# Patient Record
Sex: FEMALE | Race: AMERICAN INDIAN OR ALASKA NATIVE
[De-identification: names, ages, dates, MRNs, and addresses within clinical notes are randomized per-mention and may not be internally consistent; named-entity substitution may affect disease eponyms.]

---

## 2018-06-27 ENCOUNTER — HOSPITAL ENCOUNTER (EMERGENCY)
Dept: HOSPITAL 5 - ED | Age: 48
Discharge: HOME | End: 2018-06-27
Payer: COMMERCIAL

## 2018-06-27 VITALS — DIASTOLIC BLOOD PRESSURE: 90 MMHG | SYSTOLIC BLOOD PRESSURE: 147 MMHG

## 2018-06-27 DIAGNOSIS — E11.9: ICD-10-CM

## 2018-06-27 DIAGNOSIS — Z79.84: ICD-10-CM

## 2018-06-27 DIAGNOSIS — I10: ICD-10-CM

## 2018-06-27 DIAGNOSIS — R10.12: Primary | ICD-10-CM

## 2018-06-27 DIAGNOSIS — Z90.710: ICD-10-CM

## 2018-06-27 DIAGNOSIS — Z86.73: ICD-10-CM

## 2018-06-27 LAB
ALBUMIN SERPL-MCNC: 4.4 G/DL (ref 3.9–5)
ALT SERPL-CCNC: 19 UNITS/L (ref 7–56)
BASOPHILS # (AUTO): 0 K/MM3 (ref 0–0.1)
BASOPHILS NFR BLD AUTO: 0.7 % (ref 0–1.8)
BILIRUB UR QL STRIP: (no result)
BLOOD UR QL VISUAL: (no result)
BUN SERPL-MCNC: 11 MG/DL (ref 7–17)
BUN/CREAT SERPL: 12 %
CALCIUM SERPL-MCNC: 9.5 MG/DL (ref 8.4–10.2)
EOSINOPHIL # BLD AUTO: 0.1 K/MM3 (ref 0–0.4)
EOSINOPHIL NFR BLD AUTO: 1 % (ref 0–4.3)
HCT VFR BLD CALC: 38.9 % (ref 30.3–42.9)
HEMOLYSIS INDEX: 4
HGB BLD-MCNC: 13.3 GM/DL (ref 10.1–14.3)
LIPASE SERPL-CCNC: 46 UNITS/L (ref 13–60)
LYMPHOCYTES # BLD AUTO: 2.1 K/MM3 (ref 1.2–5.4)
LYMPHOCYTES NFR BLD AUTO: 34.9 % (ref 13.4–35)
MCH RBC QN AUTO: 32 PG (ref 28–32)
MCHC RBC AUTO-ENTMCNC: 34 % (ref 30–34)
MCV RBC AUTO: 94 FL (ref 79–97)
MONOCYTES # (AUTO): 0.4 K/MM3 (ref 0–0.8)
MONOCYTES % (AUTO): 7.1 % (ref 0–7.3)
PH UR STRIP: 6 [PH] (ref 5–7)
PLATELET # BLD: 153 K/MM3 (ref 140–440)
PROT UR STRIP-MCNC: (no result) MG/DL
RBC # BLD AUTO: 4.14 M/MM3 (ref 3.65–5.03)
RBC #/AREA URNS HPF: 1 /HPF (ref 0–6)
UROBILINOGEN UR-MCNC: < 2 MG/DL (ref ?–2)
WBC #/AREA URNS HPF: 1 /HPF (ref 0–6)

## 2018-06-27 PROCEDURE — 81001 URINALYSIS AUTO W/SCOPE: CPT

## 2018-06-27 PROCEDURE — 85025 COMPLETE CBC W/AUTO DIFF WBC: CPT

## 2018-06-27 PROCEDURE — 83690 ASSAY OF LIPASE: CPT

## 2018-06-27 PROCEDURE — 99283 EMERGENCY DEPT VISIT LOW MDM: CPT

## 2018-06-27 PROCEDURE — 80053 COMPREHEN METABOLIC PANEL: CPT

## 2018-06-27 PROCEDURE — 36415 COLL VENOUS BLD VENIPUNCTURE: CPT

## 2018-06-27 NOTE — EMERGENCY DEPARTMENT REPORT
ED Abdominal Pain HPI





- General


Chief Complaint: Abdominal Pain


Stated Complaint: ABD PAIN


Time Seen by Provider: 06/27/18 14:42


Source: patient


Mode of arrival: Ambulatory


Limitations: No Limitations





- History of Present Illness


MD Complaint: flank pain


-: month(s) (4)


Location: L flank


Radiation: LUQ


Severity: moderate


Quality: cramping, sharp, dull


Improves With: other (Tylenol)


Worsens With: movement


Context: other (lifts heavy boxes at work, normal physical 2 months ago in 

April by Dr. Ida Joshua)





- Related Data


 Previous Rx's











 Medication  Instructions  Recorded  Last Taken  Type


 


Aspirin [Aspirin BABY CHEW TAB] 81 mg PO QDAY #30 tab.chew 06/03/16 Unknown Rx


 


Hydrochlorothiazide [HCTZ] 25 mg PO QDAY #30 tablet 06/03/16 Unknown Rx


 


Insulin Glargine [Lantus VIAL] 15 units SUB-Q QHS #10 units 06/03/16 Unknown Rx


 


Lisinopril [Zestril TAB] 10 mg PO QDAY #30 tablet 06/03/16 Unknown Rx


 


Metoprolol [Lopressor TAB] 25 mg PO BID #60 tablet 06/03/16 Unknown Rx


 


metFORMIN [Glucophage] 500 mg PO BID #60 tablet 06/03/16 Unknown Rx


 


Ibuprofen 800 mg PO Q6H PRN #20 tablet 06/27/18 Unknown Rx











 Allergies











Allergy/AdvReac Type Severity Reaction Status Date / Time


 


No Known Allergies Allergy   Unverified 07/28/14 07:31














ED Review of Systems


ROS: 


Stated complaint: ABD PAIN


Other details as noted in HPI





Comment: All other systems reviewed and negative


Constitutional: denies: fever, malaise


Respiratory: denies: cough


Cardiovascular: denies: chest pain





ED Past Medical Hx





- Past Medical History


Previous Medical History?: Yes


Hx Hypertension: Yes


Hx CVA: Yes


Hx Heart Attack/AMI: No


Hx Congestive Heart Failure: No


Hx Diabetes: Yes (Newly diagnosed this admission.)


Hx Deep Vein Thrombosis: No


Hx Liver Disease: No


Hx Sickle Cell Disease: No


Hx Asthma: No


Hx COPD: No


Hx Dementia: No


Hx HIV: No





- Surgical History


Past Surgical History?: Yes


Hx Coronary Stent: No


Hx Open Heart Surgery: No


Hx Pacemaker: No


Hx Internal Defibrillator: No


Hx Cholecystectomy: No


Hx Appendectomy: No


Hx Breast Surgery: No


Additional Surgical History: hysterectomy





- Social History


Smoking Status: Never Smoker


Substance Use Type: Prescribed





- Medications


Home Medications: 


 Home Medications











 Medication  Instructions  Recorded  Confirmed  Last Taken  Type


 


Aspirin [Aspirin BABY CHEW TAB] 81 mg PO QDAY #30 tab.chew 06/03/16  Unknown Rx


 


Hydrochlorothiazide [HCTZ] 25 mg PO QDAY #30 tablet 06/03/16  Unknown Rx


 


Insulin Glargine [Lantus VIAL] 15 units SUB-Q QHS #10 units 06/03/16  Unknown Rx


 


Lisinopril [Zestril TAB] 10 mg PO QDAY #30 tablet 06/03/16  Unknown Rx


 


Metoprolol [Lopressor TAB] 25 mg PO BID #60 tablet 06/03/16  Unknown Rx


 


metFORMIN [Glucophage] 500 mg PO BID #60 tablet 06/03/16  Unknown Rx


 


Ibuprofen 800 mg PO Q6H PRN #20 tablet 06/27/18  Unknown Rx














ED Physical Exam





- General


Limitations: No Limitations


General appearance: alert, in no apparent distress





- Head


Head exam: Present: atraumatic, normocephalic





- Eye


Eye exam: Present: normal appearance





- ENT


ENT exam: Present: mucous membranes moist





- Neck


Neck exam: Present: normal inspection.  Absent: tenderness, meningismus





- Respiratory


Respiratory exam: Present: normal lung sounds bilaterally.  Absent: respiratory 

distress, wheezes, rales, rhonchi





- Cardiovascular


Cardiovascular Exam: Present: regular rate, normal rhythm, normal heart sounds.

  Absent: bradycardia, tachycardia, systolic murmur, diastolic murmur, rubs, 

gallop





- GI/Abdominal


GI/Abdominal exam: Present: soft.  Absent: distended, tenderness, guarding, 

rebound





- Extremities Exam


Extremities exam: Present: normal inspection





- Back Exam


Back exam: Present: normal inspection





- Neurological Exam


Neurological exam: Present: alert, oriented X3





- Psychiatric


Psychiatric exam: Present: normal affect, normal mood





- Skin


Skin exam: Present: warm, dry, intact, normal color.  Absent: rash





ED Course





 Vital Signs











  06/27/18





  12:40


 


Temperature 98.9 F


 


Pulse Rate 98 H


 


Respiratory 20





Rate 


 


Blood Pressure 155/84


 


O2 Sat by Pulse 98





Oximetry 














ED Medical Decision Making





- Lab Data


Result diagrams: 


 06/27/18 12:57





 06/27/18 12:57








 Laboratory Results - last 24 hr











  06/27/18 06/27/18 06/27/18





  12:57 12:57 14:12


 


WBC  6.0  


 


RBC  4.14  


 


Hgb  13.3  


 


Hct  38.9  


 


MCV  94  


 


MCH  32  


 


MCHC  34  


 


RDW  14.0  


 


Plt Count  153  


 


Lymph % (Auto)  34.9  


 


Mono % (Auto)  7.1  


 


Eos % (Auto)  1.0  


 


Baso % (Auto)  0.7  


 


Lymph #  2.1  


 


Mono #  0.4  


 


Eos #  0.1  


 


Baso #  0.0  


 


Seg Neutrophils %  56.3  


 


Seg Neutrophils #  3.4  


 


Sodium   136 L 


 


Potassium   3.0 L 


 


Chloride   97.5 L 


 


Carbon Dioxide   28 


 


Anion Gap   14 


 


BUN   11 


 


Creatinine   0.9 


 


Estimated GFR   > 60 


 


BUN/Creatinine Ratio   12 


 


Glucose   116 H 


 


Calcium   9.5 


 


Total Bilirubin   0.30 


 


AST   16 


 


ALT   19 


 


Alkaline Phosphatase   67 


 


Total Protein   7.8 


 


Albumin   4.4 


 


Albumin/Globulin Ratio   1.3 


 


Lipase   46 


 


Urine Color    Straw


 


Urine Turbidity    Clear


 


Urine pH    6.0


 


Ur Specific Gravity    1.003


 


Urine Protein    <15 mg/dl


 


Urine Glucose (UA)    Neg


 


Urine Ketones    Neg


 


Urine Blood    Neg


 


Urine Nitrite    Neg


 


Urine Bilirubin    Neg


 


Urine Urobilinogen    < 2.0


 


Ur Leukocyte Esterase    Neg


 


Urine WBC (Auto)    1.0


 


Urine RBC (Auto)    1.0


 


U Epithel Cells (Auto)    1.0











 Vital Signs - 24 hr











  06/27/18





  12:40


 


Temperature 98.9 F


 


Pulse Rate 98 H


 


Respiratory 20





Rate 


 


Blood Pressure 155/84


 


O2 Sat by Pulse 98





Oximetry 














- Medical Decision Making





Mrs. Guo is a 47-year-old female with history of hypertension and glucose 

intolerance who presents with left flank pain for 4 months.  She does lift 

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  She has 

had previous back pain.  However she does not feel that this is muscular.  I 

explained that she did not have signs of kidney infection or kidney stone.  I 

strongly recommended outpatient follow-up with potential MRI.  I recommended 

over-the-counter ibuprofen.  I prescribed 800 mg tabs of ibuprofen also.


Critical care attestation.: 


If time is entered above; I have spent that time in minutes in the direct care 

of this critically ill patient, excluding procedure time.








ED Disposition


Clinical Impression: 


 Left flank pain





Disposition: DC-01 TO HOME OR SELFCARE


Is pt being admited?: No


Does the pt Need Aspirin: No


Condition: Stable


Instructions:  Flank Pain (ED)


Prescriptions: 


Ibuprofen 800 mg PO Q6H PRN #20 tablet


 PRN Reason: Pain , Severe (7-10)


Referrals: 


PRIMARY CARE,MD [Primary Care Provider] - 3-5 Days


Time of Disposition: 15:21